# Patient Record
Sex: FEMALE | Race: WHITE | NOT HISPANIC OR LATINO | Employment: FULL TIME | URBAN - METROPOLITAN AREA
[De-identification: names, ages, dates, MRNs, and addresses within clinical notes are randomized per-mention and may not be internally consistent; named-entity substitution may affect disease eponyms.]

---

## 2023-06-06 ENCOUNTER — OFFICE VISIT (OUTPATIENT)
Dept: PEDIATRICS | Facility: CLINIC | Age: 28
End: 2023-06-06
Payer: COMMERCIAL

## 2023-06-06 VITALS
BODY MASS INDEX: 29.71 KG/M2 | HEART RATE: 81 BPM | DIASTOLIC BLOOD PRESSURE: 84 MMHG | HEIGHT: 64 IN | SYSTOLIC BLOOD PRESSURE: 123 MMHG | WEIGHT: 174 LBS

## 2023-06-06 VITALS — BODY MASS INDEX: 30.45 KG/M2 | WEIGHT: 176 LBS | TEMPERATURE: 97.8 F

## 2023-06-06 DIAGNOSIS — Z23 NEED FOR VACCINATION: ICD-10-CM
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
## 2023-06-06 PROBLEM — N94.6 DYSMENORRHEA: Status: ACTIVE | Noted: 2023-06-06

## 2023-06-06 PROBLEM — K59.00 CONSTIPATION: Status: ACTIVE | Noted: 2023-06-06

## 2023-06-06 PROBLEM — F32.A DEPRESSION: Status: ACTIVE | Noted: 2023-06-06

## 2023-06-06 PROCEDURE — 90715 TDAP VACCINE 7 YRS/> IM: CPT | Performed by: PEDIATRICS

## 2023-06-06 PROCEDURE — 90471 IMMUNIZATION ADMIN: CPT | Performed by: PEDIATRICS

## 2023-06-06 PROCEDURE — 99213 OFFICE O/P EST LOW 20 MIN: CPT | Performed by: PEDIATRICS

## 2023-06-06 RX ORDER — LEVONORGESTREL AND ETHINYL ESTRADIOL 100-20(84)
KIT ORAL
COMMUNITY
End: 2023-11-13 | Stop reason: ALTCHOICE

## 2023-06-06 RX ORDER — SERTRALINE HYDROCHLORIDE 50 MG/1
1 TABLET, FILM COATED ORAL DAILY
COMMUNITY
Start: 2022-05-27 | End: 2023-10-25 | Stop reason: SDUPTHER

## 2023-06-06 NOTE — PROGRESS NOTES
Subjective   Patient ID: Mary Alejo is a 27 y.o. female who presents for Animal Bite (Here for mouse bite on finger).    HPI   Happened last night-  Wild mouse trapped in a snaptrap- caught in basement  They were trying to let it go outside and it bit rt hand index finger- broke skin- tiny amount of blood  Washed with soap, alcohol wipe, neosporin    Today it looks fully healed- can't even tell where the scratch was      Review of Systems    Objective   Temp 36.6 °C (97.8 °F)   Wt 79.8 kg (176 lb)   BMI 30.45 kg/m²     Physical Exam  Constitutional:       Appearance: Normal appearance.   Cardiovascular:      Rate and Rhythm: Normal rate and regular rhythm.      Heart sounds: No murmur heard.  Pulmonary:      Effort: Pulmonary effort is normal.      Breath sounds: Normal breath sounds.   Skin:     Findings: No lesion (points to tip of rt index finger where scratch was yesterday- can't see it at all, no redness/ tenderness) or rash.   Neurological:      Mental Status: She is alert.         Assessment/Plan   Diagnoses and all orders for this visit:  Bitten by mouse, initial encounter  Need for vaccination  -     Tdap vaccine, age 10 years and older (BOOSTRIX)    Discussed small risk of rabies and infections carried by mice. This bite was not unprovoked.   Watch site for s/o infection  Watch for systemic illness/ seek care prn  TdaP updated (has been 9 years since last dose)  Q and concerns addressed

## 2023-10-25 ENCOUNTER — TELEPHONE (OUTPATIENT)
Dept: PEDIATRICS | Facility: CLINIC | Age: 28
End: 2023-10-25
Payer: COMMERCIAL

## 2023-10-25 DIAGNOSIS — F33.42 RECURRENT MAJOR DEPRESSIVE DISORDER, IN FULL REMISSION (CMS-HCC): Primary | ICD-10-CM

## 2023-10-25 RX ORDER — SERTRALINE HYDROCHLORIDE 50 MG/1
50 TABLET, FILM COATED ORAL DAILY
Qty: 30 TABLET | Refills: 2 | Status: SHIPPED | OUTPATIENT
Start: 2023-10-25 | End: 2024-01-31 | Stop reason: SDUPTHER

## 2023-11-13 ENCOUNTER — OFFICE VISIT (OUTPATIENT)
Dept: PEDIATRICS | Facility: CLINIC | Age: 28
End: 2023-11-13
Payer: COMMERCIAL

## 2023-11-13 VITALS
BODY MASS INDEX: 29.3 KG/M2 | WEIGHT: 171.6 LBS | HEART RATE: 72 BPM | DIASTOLIC BLOOD PRESSURE: 81 MMHG | SYSTOLIC BLOOD PRESSURE: 132 MMHG | HEIGHT: 64 IN

## 2023-11-13 DIAGNOSIS — F33.42 RECURRENT MAJOR DEPRESSIVE DISORDER, IN FULL REMISSION (CMS-HCC): ICD-10-CM

## 2023-11-13 PROCEDURE — 99213 OFFICE O/P EST LOW 20 MIN: CPT | Performed by: PEDIATRICS

## 2023-11-13 NOTE — PROGRESS NOTES
"Subjective   Patient ID: Mary Alejo is a 28 y.o. female who presents for med check.  HPI    Pt here with:    Doing well.  No problems with Rx, working ok.  Working at [employer], better job.  Sleep ok, eating ok, energy ok.    General: no fevers; normal appetite; normal PO fluids; normal UOP; normal activity  HEENT: no otalgia; no congestion; no sore throat  Pulmonary symptoms: no cough; no increased WOB  GI: no abdominal pain; no vomiting; no diarrhea; no nausea  Skin: no rash    Visit Vitals  /81   Pulse 72   Ht 1.626 m (5' 4\")   Wt 77.8 kg (171 lb 9.6 oz)   LMP 10/30/2023 (Exact Date)   BMI 29.46 kg/m²   Smoking Status Unknown   BSA 1.87 m²      Objective   Physical Exam  Vitals reviewed.   Constitutional:       Appearance: Normal appearance. She is well-developed. She is not toxic-appearing.   HENT:      Right Ear: Tympanic membrane and ear canal normal.      Left Ear: Tympanic membrane and ear canal normal.      Nose: Nose normal. No congestion.      Mouth/Throat:      Mouth: Mucous membranes are moist.      Pharynx: No oropharyngeal exudate or posterior oropharyngeal erythema.   Eyes:      Conjunctiva/sclera: Conjunctivae normal.   Cardiovascular:      Rate and Rhythm: Normal rate and regular rhythm.      Heart sounds: Normal heart sounds. No murmur heard.  Pulmonary:      Effort: No respiratory distress or retractions.      Breath sounds: Normal breath sounds. No stridor or decreased air movement. No wheezing, rhonchi or rales.      Comments: AE good  No retractions  Abdominal:      General: Bowel sounds are normal.      Palpations: Abdomen is soft. There is no mass.      Tenderness: There is no abdominal tenderness.   Musculoskeletal:      Cervical back: Normal range of motion.   Lymphadenopathy:      Cervical: No cervical adenopathy.   Skin:     Findings: No rash.         Reviewed the following with parent/patient prior to end of visit:  YES - Supportive Care / Observation  YES - " Acetaminophen / Ibuprofen as needed  YES - Monitor PO fluid intake and urine output  YES - Call or return to office if worsens  YES - Family understands plan and all questions answered  YES - Discussed all orders from visit and any results received today.  NO - Family instructed to call __ days after going for test to obtain results    Assessment/Plan       1. Recurrent major depressive disorder, in full remission (CMS/HCC)    Stable, may refill meds for 1 year.  Mary will call with correct pharmacy.    No problem-specific Assessment & Plan notes found for this encounter.      Problem List Items Addressed This Visit       Depression

## 2023-12-11 ENCOUNTER — HOSPITAL ENCOUNTER (EMERGENCY)
Facility: HOSPITAL | Age: 28
Discharge: HOME | End: 2023-12-11
Attending: INTERNAL MEDICINE
Payer: COMMERCIAL

## 2023-12-11 ENCOUNTER — APPOINTMENT (OUTPATIENT)
Dept: RADIOLOGY | Facility: HOSPITAL | Age: 28
End: 2023-12-11
Payer: COMMERCIAL

## 2023-12-11 ENCOUNTER — APPOINTMENT (OUTPATIENT)
Dept: CARDIOLOGY | Facility: HOSPITAL | Age: 28
End: 2023-12-11
Payer: COMMERCIAL

## 2023-12-11 VITALS
DIASTOLIC BLOOD PRESSURE: 78 MMHG | SYSTOLIC BLOOD PRESSURE: 138 MMHG | BODY MASS INDEX: 28.88 KG/M2 | WEIGHT: 163 LBS | TEMPERATURE: 96.8 F | RESPIRATION RATE: 18 BRPM | HEART RATE: 84 BPM | HEIGHT: 63 IN | OXYGEN SATURATION: 100 %

## 2023-12-11 DIAGNOSIS — R10.11 RIGHT UPPER QUADRANT ABDOMINAL PAIN: Primary | ICD-10-CM

## 2023-12-11 LAB
ALBUMIN SERPL BCP-MCNC: 4.5 G/DL (ref 3.4–5)
ALP SERPL-CCNC: 119 U/L (ref 33–110)
ALT SERPL W P-5'-P-CCNC: 14 U/L (ref 7–45)
ANION GAP SERPL CALC-SCNC: 13 MMOL/L (ref 10–20)
APPEARANCE UR: ABNORMAL
AST SERPL W P-5'-P-CCNC: 14 U/L (ref 9–39)
B-HCG SERPL-ACNC: <2 MIU/ML
BASOPHILS # BLD AUTO: 0.07 X10*3/UL (ref 0–0.1)
BASOPHILS NFR BLD AUTO: 0.6 %
BILIRUB SERPL-MCNC: 0.2 MG/DL (ref 0–1.2)
BILIRUB UR STRIP.AUTO-MCNC: NEGATIVE MG/DL
BUN SERPL-MCNC: 16 MG/DL (ref 6–23)
CALCIUM SERPL-MCNC: 9.2 MG/DL (ref 8.6–10.3)
CHLORIDE SERPL-SCNC: 103 MMOL/L (ref 98–107)
CO2 SERPL-SCNC: 24 MMOL/L (ref 21–32)
COLOR UR: YELLOW
CREAT SERPL-MCNC: 0.7 MG/DL (ref 0.5–1.05)
EOSINOPHIL # BLD AUTO: 0.12 X10*3/UL (ref 0–0.7)
EOSINOPHIL NFR BLD AUTO: 1 %
ERYTHROCYTE [DISTWIDTH] IN BLOOD BY AUTOMATED COUNT: 18.2 % (ref 11.5–14.5)
GFR SERPL CREATININE-BSD FRML MDRD: >90 ML/MIN/1.73M*2
GLUCOSE SERPL-MCNC: 95 MG/DL (ref 74–99)
GLUCOSE UR STRIP.AUTO-MCNC: NEGATIVE MG/DL
HCT VFR BLD AUTO: 39.5 % (ref 36–46)
HGB BLD-MCNC: 11.8 G/DL (ref 12–16)
HOLD SPECIMEN: NORMAL
IMM GRANULOCYTES # BLD AUTO: 0.04 X10*3/UL (ref 0–0.7)
IMM GRANULOCYTES NFR BLD AUTO: 0.3 % (ref 0–0.9)
KETONES UR STRIP.AUTO-MCNC: NEGATIVE MG/DL
LEUKOCYTE ESTERASE UR QL STRIP.AUTO: ABNORMAL
LIPASE SERPL-CCNC: 58 U/L (ref 9–82)
LYMPHOCYTES # BLD AUTO: 2.63 X10*3/UL (ref 1.2–4.8)
LYMPHOCYTES NFR BLD AUTO: 22.4 %
MAGNESIUM SERPL-MCNC: 2.08 MG/DL (ref 1.6–2.4)
MCH RBC QN AUTO: 22 PG (ref 26–34)
MCHC RBC AUTO-ENTMCNC: 29.9 G/DL (ref 32–36)
MCV RBC AUTO: 74 FL (ref 80–100)
MONOCYTES # BLD AUTO: 0.82 X10*3/UL (ref 0.1–1)
MONOCYTES NFR BLD AUTO: 7 %
MUCOUS THREADS #/AREA URNS AUTO: NORMAL /LPF
NEUTROPHILS # BLD AUTO: 8.04 X10*3/UL (ref 1.2–7.7)
NEUTROPHILS NFR BLD AUTO: 68.7 %
NITRITE UR QL STRIP.AUTO: NEGATIVE
NRBC BLD-RTO: 0 /100 WBCS (ref 0–0)
PH UR STRIP.AUTO: 5 [PH]
PLATELET # BLD AUTO: 359 X10*3/UL (ref 150–450)
POTASSIUM SERPL-SCNC: 3.8 MMOL/L (ref 3.5–5.3)
PROT SERPL-MCNC: 7.6 G/DL (ref 6.4–8.2)
PROT UR STRIP.AUTO-MCNC: NEGATIVE MG/DL
RBC # BLD AUTO: 5.36 X10*6/UL (ref 4–5.2)
RBC # UR STRIP.AUTO: NEGATIVE /UL
RBC #/AREA URNS AUTO: NORMAL /HPF
SODIUM SERPL-SCNC: 136 MMOL/L (ref 136–145)
SP GR UR STRIP.AUTO: 1.03
SQUAMOUS #/AREA URNS AUTO: NORMAL /HPF
UROBILINOGEN UR STRIP.AUTO-MCNC: <2 MG/DL
WBC # BLD AUTO: 11.7 X10*3/UL (ref 4.4–11.3)
WBC #/AREA URNS AUTO: NORMAL /HPF
WBC CLUMPS #/AREA URNS AUTO: NORMAL /HPF

## 2023-12-11 PROCEDURE — 96361 HYDRATE IV INFUSION ADD-ON: CPT

## 2023-12-11 PROCEDURE — 76705 ECHO EXAM OF ABDOMEN: CPT | Mod: FOREIGN READ | Performed by: RADIOLOGY

## 2023-12-11 PROCEDURE — 2500000004 HC RX 250 GENERAL PHARMACY W/ HCPCS (ALT 636 FOR OP/ED): Performed by: INTERNAL MEDICINE

## 2023-12-11 PROCEDURE — 96374 THER/PROPH/DIAG INJ IV PUSH: CPT

## 2023-12-11 PROCEDURE — 83690 ASSAY OF LIPASE: CPT | Performed by: INTERNAL MEDICINE

## 2023-12-11 PROCEDURE — 76705 ECHO EXAM OF ABDOMEN: CPT | Mod: FR

## 2023-12-11 PROCEDURE — 80053 COMPREHEN METABOLIC PANEL: CPT | Performed by: INTERNAL MEDICINE

## 2023-12-11 PROCEDURE — 36415 COLL VENOUS BLD VENIPUNCTURE: CPT | Performed by: INTERNAL MEDICINE

## 2023-12-11 PROCEDURE — 99285 EMERGENCY DEPT VISIT HI MDM: CPT | Mod: 25

## 2023-12-11 PROCEDURE — 81001 URINALYSIS AUTO W/SCOPE: CPT | Performed by: INTERNAL MEDICINE

## 2023-12-11 PROCEDURE — 84702 CHORIONIC GONADOTROPIN TEST: CPT | Performed by: INTERNAL MEDICINE

## 2023-12-11 PROCEDURE — 87086 URINE CULTURE/COLONY COUNT: CPT | Mod: PARLAB | Performed by: INTERNAL MEDICINE

## 2023-12-11 PROCEDURE — 85025 COMPLETE CBC W/AUTO DIFF WBC: CPT | Performed by: INTERNAL MEDICINE

## 2023-12-11 PROCEDURE — 93005 ELECTROCARDIOGRAM TRACING: CPT

## 2023-12-11 PROCEDURE — 83735 ASSAY OF MAGNESIUM: CPT | Performed by: INTERNAL MEDICINE

## 2023-12-11 RX ORDER — FAMOTIDINE 10 MG/ML
20 INJECTION INTRAVENOUS ONCE
Status: COMPLETED | OUTPATIENT
Start: 2023-12-11 | End: 2023-12-11

## 2023-12-11 RX ORDER — FAMOTIDINE 20 MG/1
20 TABLET, FILM COATED ORAL 2 TIMES DAILY
Qty: 14 TABLET | Refills: 0 | Status: SHIPPED | OUTPATIENT
Start: 2023-12-11 | End: 2023-12-18

## 2023-12-11 RX ADMIN — FAMOTIDINE 20 MG: 10 INJECTION, SOLUTION INTRAVENOUS at 09:46

## 2023-12-11 RX ADMIN — SODIUM CHLORIDE 1000 ML: 9 INJECTION, SOLUTION INTRAVENOUS at 09:46

## 2023-12-11 ASSESSMENT — COLUMBIA-SUICIDE SEVERITY RATING SCALE - C-SSRS
2. HAVE YOU ACTUALLY HAD ANY THOUGHTS OF KILLING YOURSELF?: NO
1. IN THE PAST MONTH, HAVE YOU WISHED YOU WERE DEAD OR WISHED YOU COULD GO TO SLEEP AND NOT WAKE UP?: NO
6. HAVE YOU EVER DONE ANYTHING, STARTED TO DO ANYTHING, OR PREPARED TO DO ANYTHING TO END YOUR LIFE?: NO

## 2023-12-11 ASSESSMENT — PAIN - FUNCTIONAL ASSESSMENT: PAIN_FUNCTIONAL_ASSESSMENT: 0-10

## 2023-12-11 ASSESSMENT — PAIN SCALES - GENERAL: PAINLEVEL_OUTOF10: 0 - NO PAIN

## 2023-12-11 NOTE — PROGRESS NOTES
Pharmacy Medication History Review    Mary Alejo is a 28 y.o. female admitted for No Principal Problem: There is no principal problem currently on the Problem List. Please update the Problem List and refresh.. Pharmacy reviewed the patient's [id number]-[id number] medications and allergies for accuracy.    The list below reflectives the updated PTA list. Please review each medication in order reconciliation for additional clarification and justification.  (Not in a hospital admission)       The list below reflectives the updated allergy list. Please review each documented allergy for additional clarification and justification.  Allergies  Reviewed by Vannessa Enamorado CPhT on 12/11/2023        Severity Reactions Comments    Sulfa (sulfonamide Antibiotics) Medium Hives             Below are additional concerns with the patient's PTA list.    See PTA med list    Vannessa Enamorado CPhT

## 2023-12-11 NOTE — ED PROVIDER NOTES
HPI   Chief Complaint   Patient presents with    Abdominal Pain     Right upper quad abdominal pain with tenderness, sent from urgent care.       Patient presenting for evaluation of abdominal pain.  Patient notes that approximately an hour before arrival to urgent care earlier today she woke up having pain in the upper part of her abdomen.  Patient notes that it felt like a sharp pain.  Patient denies vomiting or diarrhea.  Patient denies suspicious food intake.  Patient denies recent travel.  Patient notes she took ibuprofen without relief.  Patient was evaluated at an urgent care who noted right upper quadrant pain and recommended she come to the emergency department for imaging.  Patient denies possibility of pregnancy.  Patient denies any history of gallbladder disease.      History provided by:  Patient                      [place] Coma Scale Score: 15                  Patient History   Past Medical History:   Diagnosis Date    Acne     Anxiety     External hemorrhoids      Past Surgical History:   Procedure Laterality Date    OTHER SURGICAL HISTORY  10/07/2019    Dental surgery     No family history on file.  Social History     Tobacco Use    Smoking status: Unknown     Passive exposure: Never    Smokeless tobacco: Not on file   Substance Use Topics    Alcohol use: Not on file    Drug use: Not on file       Physical Exam   ED Triage Vitals [12/11/23 0843]   Temp Heart Rate Resp BP   36.1 °C (97 °F) 81 18 157/83      SpO2 Temp Source Heart Rate Source Patient Position   98 % Temporal Monitor Sitting      BP Location FiO2 (%)     Left arm --       Physical Exam  Vitals and nursing note reviewed.   Constitutional:       Appearance: Normal appearance.   HENT:      Head: Atraumatic.      Right Ear: External ear normal.      Left Ear: External ear normal.      Nose: Nose normal.      Mouth/Throat:      Mouth: Mucous membranes are moist.   Eyes:      Extraocular Movements: Extraocular movements intact.      Pupils:  Pupils are equal, round, and reactive to light.   Cardiovascular:      Rate and Rhythm: Normal rate and regular rhythm.      Pulses: Normal pulses.   Pulmonary:      Effort: Pulmonary effort is normal.      Breath sounds: Normal breath sounds.   Abdominal:      Palpations: Abdomen is soft.      Tenderness: There is abdominal tenderness in the right upper quadrant.   Musculoskeletal:         General: No tenderness. Normal range of motion.      Cervical back: Normal range of motion and neck supple. No rigidity or tenderness.   Skin:     General: Skin is warm and dry.   Neurological:      General: No focal deficit present.      Mental Status: She is alert and oriented to person, place, and time. Mental status is at baseline.   Psychiatric:         Mood and Affect: Mood normal.         Behavior: Behavior normal.         ED Course & MDM   ED Course as of 12/11/23 1315   Mon Dec 11, 2023   1214 Reevaluated patient.  Patient notes she is pain-free at this time.  Discussed ultrasound and lab findings.  Patient agrees with plan to follow-up as outpatient return to ED if having worsening symptoms or other concerns. [JA]      ED Course User Index  [JA] Jose Hamilton DO         Diagnoses as of 12/11/23 1315   Right upper quadrant abdominal pain       Medical Decision Making  Differential diagnosis: Acute cholecystitis, gastritis, MI, PE, colitis, other    Pulses equal. O2 sat wnl on RA. EKG does not appear ischemic or demonstrate arrhythmia.   HEART score is low risk minus troponin.  PERC score is negative.  No known aortic pathology or findings to suggest aortic dissection.   No infectious source and imaging negative for pneumonia.   No anemia or electrolyte imbalance.   Minimal right upper quadrant tenderness on exam.  Ultrasound negative.  No significant change in LFTs although there is mild elevation in alkaline phosphatase.  Symptoms resolved with IV Pepcid.  Will treat with Pepcid p.o. twice daily x 7 days    Patient  agrees to follow up with PCP.     Patient agrees to return to the emergency department for further evaluation if having return of or worsening symptoms, chest pain, shortness of breath, numbness/tingling of the extremities, fever or other concerns.           Procedure  ECG 12 lead    Performed by: Joes Hamilton DO  Authorized by: Jose Hamilton DO    ECG interpreted by ED Physician in the absence of a cardiologist: yes    Previous ECG:     Previous ECG:  Unavailable  Comments:      12/11/2023 13: 06 sinus rhythm, rate 66, normal axis, ST segments normal, T waves normal, normal EKG.  EKG interpreted by myself.       Jose Hamilton DO  12/11/23 1583

## 2023-12-12 LAB — BACTERIA UR CULT: NORMAL

## 2024-01-25 LAB
ATRIAL RATE: 66 BPM
P AXIS: 47 DEGREES
PR INTERVAL: 134 MS
Q ONSET: 254 MS
QRS COUNT: 11 BEATS
QRS DURATION: 99 MS
QT INTERVAL: 395 MS
QTC CALCULATION(BAZETT): 414 MS
QTC FREDERICIA: 408 MS
R AXIS: 35 DEGREES
T AXIS: 40 DEGREES
T OFFSET: 451 MS
VENTRICULAR RATE: 66 BPM

## 2024-01-31 ENCOUNTER — TELEPHONE (OUTPATIENT)
Dept: PEDIATRICS | Facility: CLINIC | Age: 29
End: 2024-01-31
Payer: COMMERCIAL

## 2024-01-31 DIAGNOSIS — F33.42 RECURRENT MAJOR DEPRESSIVE DISORDER, IN FULL REMISSION (CMS-HCC): ICD-10-CM

## 2024-01-31 RX ORDER — SERTRALINE HYDROCHLORIDE 50 MG/1
50 TABLET, FILM COATED ORAL DAILY
Qty: 90 TABLET | Refills: 2 | Status: SHIPPED | OUTPATIENT
Start: 2024-01-31 | End: 2024-02-05 | Stop reason: SDUPTHER

## 2024-02-05 ENCOUNTER — TELEPHONE (OUTPATIENT)
Dept: PEDIATRICS | Facility: CLINIC | Age: 29
End: 2024-02-05
Payer: COMMERCIAL

## 2024-02-05 DIAGNOSIS — F33.42 RECURRENT MAJOR DEPRESSIVE DISORDER, IN FULL REMISSION (CMS-HCC): Primary | ICD-10-CM

## 2024-02-05 RX ORDER — SERTRALINE HYDROCHLORIDE 50 MG/1
50 TABLET, FILM COATED ORAL DAILY
Qty: 90 TABLET | Refills: 0 | Status: SHIPPED | OUTPATIENT
Start: 2024-02-05

## 2024-10-17 ENCOUNTER — APPOINTMENT (OUTPATIENT)
Dept: PEDIATRICS | Facility: CLINIC | Age: 29
End: 2024-10-17
Payer: COMMERCIAL

## 2024-10-17 VITALS
BODY MASS INDEX: 29.57 KG/M2 | HEIGHT: 64 IN | SYSTOLIC BLOOD PRESSURE: 128 MMHG | DIASTOLIC BLOOD PRESSURE: 83 MMHG | WEIGHT: 173.2 LBS | HEART RATE: 73 BPM

## 2024-10-17 DIAGNOSIS — F33.42 RECURRENT MAJOR DEPRESSIVE DISORDER, IN FULL REMISSION (CMS-HCC): ICD-10-CM

## 2024-10-17 DIAGNOSIS — Z13.220 LIPID SCREENING: Primary | ICD-10-CM

## 2024-10-17 DIAGNOSIS — Z00.00 WELL ADULT EXAM: ICD-10-CM

## 2024-10-17 PROCEDURE — 3008F BODY MASS INDEX DOCD: CPT | Performed by: PEDIATRICS

## 2024-10-17 PROCEDURE — 99395 PREV VISIT EST AGE 18-39: CPT | Performed by: PEDIATRICS

## 2024-10-17 RX ORDER — SERTRALINE HYDROCHLORIDE 50 MG/1
50 TABLET, FILM COATED ORAL DAILY
Qty: 90 TABLET | Refills: 3 | Status: SHIPPED | OUTPATIENT
Start: 2024-10-17

## 2024-10-17 NOTE — PROGRESS NOTES
"Subjective   Patient ID: Mary Alejo is a 29 y.o. female who presents for Well Child (Here for WCC /med check ).  HPI    Pt here with:      18+ year female checkup    Diet and Nutrition:  ?  Dietary: well balanced diet, appropriate Calcium intake.  Sleep:  ?  Sleep: No problems with sleep.  Elimination:  ?  Elimination: normal bowel movement frequency, normal consistency.  Genitourinary:  ?  Female Genitourinary: No problems with periods.  ?  Exercise: gets regular exercise, physical activity level discussed and encouraged.    Visit Vitals  /83   Pulse 73   Ht 1.623 m (5' 3.88\")   Wt 78.6 kg (173 lb 3.2 oz)   BMI 29.84 kg/m²   OB Status Having periods   Smoking Status Unknown   BSA 1.88 m²     Objective   Physical Exam  Vitals reviewed.   Constitutional:       General: She is not in acute distress.     Appearance: She is not toxic-appearing.   HENT:      Right Ear: Tympanic membrane and ear canal normal.      Left Ear: Tympanic membrane and ear canal normal.      Nose: Nose normal. No congestion or rhinorrhea.      Mouth/Throat:      Mouth: Mucous membranes are moist.      Pharynx: No oropharyngeal exudate or posterior oropharyngeal erythema.   Eyes:      Conjunctiva/sclera: Conjunctivae normal.   Cardiovascular:      Rate and Rhythm: Normal rate and regular rhythm.      Heart sounds: Normal heart sounds. No murmur heard.  Pulmonary:      Effort: No respiratory distress or retractions.      Breath sounds: Normal breath sounds. No stridor or decreased air movement. No wheezing or rhonchi.   Abdominal:      Palpations: Abdomen is soft. There is no hepatomegaly, splenomegaly or mass.      Tenderness: There is no abdominal tenderness.   Genitourinary:     Comments: Declined  exam.  Musculoskeletal:         General: No signs of injury.      Thoracic back: No scoliosis.      Lumbar back: No scoliosis.   Skin:     Findings: No rash.   Neurological:      Mental Status: She is alert.      Sensory: Sensation " is intact.      Motor: Motor function is intact.      Gait: Gait is intact.   Psychiatric:         Mood and Affect: Mood normal.         YES - Family instructed to call _3_ days after going for test to obtain results  YES - OK for school and sports  NO - Family declined all or some vaccines    A/P:  Well young adult.  BMI reviewed.  Will screen for cholesterol.    F/U:  1 year  Discussed all orders from visit and any results received today.    Assessment/Plan   {Assess/PlanSmartLinks:8710    1. Lipid screening    2. Recurrent major depressive disorder, in full remission (CMS-HCC)    3. Well adult exam    Stable - refilled zoloft for 1 year.    No problem-specific Assessment & Plan notes found for this encounter.      Problem List Items Addressed This Visit       Depression    Relevant Medications    sertraline (Zoloft) 50 mg tablet     Other Visit Diagnoses       Lipid screening    -  Primary    Relevant Orders    Lipid Panel    Well adult exam

## 2024-12-10 DIAGNOSIS — F33.42 RECURRENT MAJOR DEPRESSIVE DISORDER, IN FULL REMISSION (CMS-HCC): ICD-10-CM

## 2025-01-29 RX ORDER — SERTRALINE HYDROCHLORIDE 50 MG/1
50 TABLET, FILM COATED ORAL DAILY
Qty: 90 TABLET | Refills: 1 | Status: SHIPPED | OUTPATIENT
Start: 2025-01-29